# Patient Record
(demographics unavailable — no encounter records)

---

## 2017-01-09 NOTE — ER DOCUMENT REPORT
ED Medical Screen (RME)





- General


Chief Complaint: Cough


Stated Complaint: COUGH,CONGESTION


Time seen by provider: 18:50


Mode of Arrival: Ambulatory


Information source: Patient


Notes: 


45-year-old smoker female complaining of cough and congestion intermittently 

for a long time.  She works at [employer].  No fever or chills.  pulseox 94% in 

triage.


TRAVEL OUTSIDE OF THE [place] IN LAST 30 DAYS: No





- Related Data


Allergies/Adverse Reactions: 


 





No Known Allergies Allergy (Verified 12/11/16 00:33)


 











Past Medical History





- Past Medical History


Cardiac Medical History: Reports: Hx Hypercholesterolemia


Musculoskeltal Medical History: Reports Hx Musculoskeletal Deformity, Reports 

Hx Musculoskeletal Trauma


Psychiatric Medical History: Reports: Hx Bipolar Disorder


Traumatic Medical History: Reports: Hx Fractures - Toe


Past Surgical History: Reports: Hx Oral Surgery - Jaw surgery to improve 

orthodontics





- Immunizations


Immunizations up to date: Yes


Hx Diphtheria, Pertussis, Tetanus Vaccination: Yes





Physical Exam





- Vital signs


Vitals: 





 











Temp Pulse Resp BP Pulse Ox


 


 98.5 F   90   18   141/89 H  94 


 


 01/09/17 18:45  01/09/17 18:45  01/09/17 18:45  01/09/17 18:45  01/09/17 18:45














Course





- Vital Signs


Vital signs: 





 











Temp Pulse Resp BP Pulse Ox


 


 98.5 F   90   18   141/89 H  94 


 


 01/09/17 18:45  01/09/17 18:45  01/09/17 18:45  01/09/17 18:45  01/09/17 18:45

## 2017-01-09 NOTE — ER DOCUMENT REPORT
ED Respiratory Problem





- General


Chief Complaint: Cough


Stated Complaint: COUGH,CONGESTION


Mode of Arrival: Ambulatory


Information source: Patient


Notes: 


Pt is a 45 year old year old female who presents to the ER today for cough with 

wheezing and shortness of breath and congestion that she's had for a month and 

a half.  Patient states that she does smoke a pack per day and she does have 

COPD.  She has no inhalers at home.  She admits to some intermittent fevers and 

chills.  She denies chest pain.





TRAVEL OUTSIDE OF THE [place] IN LAST 30 DAYS: No





- Related Data


Allergies/Adverse Reactions: 


 





No Known Allergies Allergy (Verified 12/11/16 00:33)


 











Past Medical History





- General


Information source: Patient





- Social History


Smoking Status: Current Every Day Smoker


Family History: Reviewed & Not Pertinent


Patient has suicidal ideation: No


Patient has homicidal ideation: No





- Past Medical History


Cardiac Medical History: Reports: Hx Hypercholesterolemia


Musculoskeltal Medical History: Reports Hx Musculoskeletal Deformity, Reports 

Hx Musculoskeletal Trauma


Psychiatric Medical History: Reports: Hx Bipolar Disorder


Traumatic Medical History: Reports: Hx Fractures - Toe


Past Surgical History: Reports: Hx Oral Surgery - Jaw surgery to improve 

orthodontics





- Immunizations


Immunizations up to date: Yes


Hx Diphtheria, Pertussis, Tetanus Vaccination: Yes





Review of Systems





- Review of Systems


Constitutional: See HPI


EENT: No symptoms reported


Cardiovascular: No symptoms reported


Respiratory: See HPI


Gastrointestinal: No symptoms reported


Genitourinary: No symptoms reported


Female Genitourinary: No symptoms reported


Musculoskeletal: No symptoms reported


Skin: No symptoms reported


Hematologic/Lymphatic: No symptoms reported


Neurological/Psychological: No symptoms reported





Physical Exam





- Vital signs


Vitals: 


 











Temp Pulse Resp BP Pulse Ox


 


 98.5 F   90   18   141/89 H  94 


 


 01/09/17 18:45  01/09/17 18:45  01/09/17 18:45  01/09/17 18:45  01/09/17 18:45














- Notes


Notes: 


PHYSICAL EXAMINATION: 


GENERAL: Mildly ill-appearing, in no acute distress. 


HEAD: Atraumatic, normocephalic. 


EYES: Pupils equal round and reactive to light, extraocular movements intact, 

sclera anicteric, conjunctiva are normal. 


ENT: ear canals without erythema or foreign body, TMs pearly grey with good 

bony landmarks, nares patent, oropharynx clear without exudates. Moist mucous 

membranes. 


NECK: Normal range of motion, supple without lymphadenopathy 


LUNGS: CTAB and equal. No wheezes rales or rhonchi. 


HEART: Regular rate and rhythm without murmurs


EXTREMITIES: Normal range of motion, no pitting edema. No cyanosis. 


NEUROLOGICAL: Cranial nerves grossly intact. Normal sensory/motor exams. 


PSYCH: Normal mood, normal affect. 


SKIN: Warm, Dry, normal turgor, no rashes or lesions noted 

















Course





- Re-evaluation


Re-evalutation: 


01/09/17 20:32


Patient has received one breathing treatment before I saw her and on my initial 

exam she has no wheezing and good air flow throughout.  I do believe that this 

is a COPD exacerbation as she did come in with an oxygenation of 94% on room 

air. Chest x ray reveals some peribronchial inflammatory changes but no 

consolidation or pneumonia.





01/09/17 20:45








- Vital Signs


Vital signs: 


 











Temp Pulse Resp BP Pulse Ox


 


 98.5 F   90   18   141/89 H  94 


 


 01/09/17 18:45  01/09/17 18:45  01/09/17 18:45  01/09/17 18:45  01/09/17 18:45














- Laboratory


Result Diagrams: 


 01/09/17 19:30





 01/09/17 19:30


Laboratory results interpreted by me: 


 











  01/09/17 01/09/17





  19:30 19:30


 


RBC  5.64 H 


 


Hgb  16.1 H 


 


Hct  47.3 H 


 


Sodium   146.2 H


 


Calcium   10.7 H


 


AST   42 H


 


ALT   60 H


 


Albumin   5.2 H














Discharge





- Discharge


Clinical Impression: 


COPD (chronic obstructive pulmonary disease)


Qualifiers:


 COPD type: unspecified COPD Qualified Code(s): J44.9 - Chronic obstructive 

pulmonary disease, unspecified





Condition: Stable


Disposition: HOME, SELF-CARE


Instructions:  Chronic Obstructive Lung Disease ([facility])


Additional Instructions: 


Return immediately for any new or worsening symptoms.





Follow up with primary care provider, call tomorrow to make followup 

appointment.


Prescriptions: 


Azithromycin [Zithromax 250 mg Tablet] 250 mg PO ASDIR PRN #6 tablet


 PRN Reason: 


Guaifenesin/D-Methorphan Hb [Robitussin Dm S-F Cough Syrup] 10 ml PO Q4 PRN #

120 syrup


 PRN Reason: 


Forms:  Return to Work